# Patient Record
Sex: FEMALE | Race: ASIAN | NOT HISPANIC OR LATINO | Employment: FULL TIME | URBAN - METROPOLITAN AREA
[De-identification: names, ages, dates, MRNs, and addresses within clinical notes are randomized per-mention and may not be internally consistent; named-entity substitution may affect disease eponyms.]

---

## 2021-07-27 ENCOUNTER — TELEPHONE (OUTPATIENT)
Dept: ORTHOPEDIC SURGERY | Facility: CLINIC | Age: 36
End: 2021-07-27

## 2021-07-27 NOTE — TELEPHONE ENCOUNTER
REQ FOR APPT / AWAITING RECORDS, TC AUTH:  [facility] ER 7-, LEFT TIBIA FX; AWAITING RECORDS FOR IMAGING    Pt AWAITING PCP TO PUT IN STAT TC AUTH

## 2021-07-28 NOTE — TELEPHONE ENCOUNTER
REQ FOR APPT:  LEFT TIBIA FX, [facility] ER 7-. RECORDS SCANNED INTO Epic, [facility] REPORT 2ND PAGE

## 2021-07-29 ENCOUNTER — OFFICE VISIT (OUTPATIENT)
Dept: ORTHOPEDIC SURGERY | Facility: CLINIC | Age: 36
End: 2021-07-29

## 2021-07-29 VITALS — OXYGEN SATURATION: 99 % | BODY MASS INDEX: 29.45 KG/M2 | HEART RATE: 76 BPM | WEIGHT: 150 LBS | HEIGHT: 60 IN

## 2021-07-29 DIAGNOSIS — S99.912A INJURY OF LEFT ANKLE, INITIAL ENCOUNTER: ICD-10-CM

## 2021-07-29 DIAGNOSIS — S82.892A CLOSED FRACTURE OF LEFT ANKLE, INITIAL ENCOUNTER: Primary | ICD-10-CM

## 2021-07-29 PROCEDURE — 99204 OFFICE O/P NEW MOD 45 MIN: CPT | Performed by: ORTHOPAEDIC SURGERY

## 2021-07-29 PROCEDURE — 27786 TREATMENT OF ANKLE FRACTURE: CPT | Performed by: ORTHOPAEDIC SURGERY

## 2021-07-29 RX ORDER — HYDROCODONE BITARTRATE AND ACETAMINOPHEN 5; 325 MG/1; MG/1
1 TABLET ORAL
COMMUNITY
End: 2021-08-31 | Stop reason: SDUPTHER

## 2021-07-29 RX ORDER — HYDROCODONE BITARTRATE AND ACETAMINOPHEN 7.5; 325 MG/1; MG/1
TABLET ORAL
Qty: 30 TABLET | Refills: 0 | Status: SHIPPED | OUTPATIENT
Start: 2021-07-29 | End: 2021-08-31 | Stop reason: DRUGHIGH

## 2021-07-29 NOTE — PROGRESS NOTES
"Chief Complaint  Initial Evaluation and Pain of the Left Ankle     Subjective      Renee Thapa presents to [facility] for an evaluation of left ankle. Patient comes in using crutches for ambulation assistance. Patient was in [place] traveling for her son and daughter [place]. She was rushing when she slipped out her camper and injured her left ankle. Injury sustained 7/25/21. She had immediate pain and swelling. She went to the ED and was placed in a short leg splint.     Allergies   Allergen Reactions   • Septra [Sulfamethoxazole-Trimethoprim] Headache        Social History     Socioeconomic History   • Marital status:      Spouse name: Not on file   • Number of children: Not on file   • Years of education: Not on file   • Highest education level: Not on file   Tobacco Use   • Smoking status: Former Smoker   • Smokeless tobacco: Never Used        Review of Systems     Objective   Vital Signs:   Pulse 76   Ht 152.4 cm (60\")   Wt 68 kg (150 lb)   SpO2 99%   BMI 29.29 kg/m²       Physical Exam  Constitutional:       Appearance: Normal appearance. He is well-developed and normal weight.   HENT:      Head: Normocephalic.      Right Ear: Hearing and external ear normal.      Left Ear: Hearing and external ear normal.      Nose: Nose normal.   Eyes:      Conjunctiva/sclera: Conjunctivae normal.   Cardiovascular:      Rate and Rhythm: Normal rate.   Pulmonary:      Effort: Pulmonary effort is normal.      Breath sounds: No wheezing or rales.   Abdominal:      Palpations: Abdomen is soft.      Tenderness: There is no abdominal tenderness.   Musculoskeletal:      Cervical back: Normal range of motion.   Skin:     Findings: No rash.   Neurological:      Mental Status: He is alert and oriented to person, place, and time.   Psychiatric:         Mood and Affect: Mood and affect normal.         Judgment: Judgment normal.       Ortho Exam      LEFT ANKLE: Ambulation " with crutches. Dorsal Pedal Pulse 2+, posteriror tibialis pulse 2+. Skin intact. Sensation grossly intact. Neurovascular intact. Achilles intact. Swelling. Brisk capillary refill. Tenderness about the medial ankle. Non-tender lateral ankle.       Orthopedic Injury Treatment    Date/Time: 7/29/2021 8:44 AM  Performed by: Nichole Monae MD  Authorized by: Nichole Monae MD   Injury location: ankle  Location details: left ankle  Injury type: fracture  Pre-procedure neurovascular assessment: neurovascularly intact    Anesthesia:  Local anesthesia used: no    Sedation:  Patient sedated: no    Immobilization: cast (short leg)  Splint type: short leg  Supplies used: cotton padding (Fiberglass)  Post-procedure neurovascular assessment: post-procedure neurovascularly intact  Patient tolerance: patient tolerated the procedure well with no immediate complications  Comments: Closed treatment was obtained and fiberglass cast was applied.  The patient tolerated the procedure without any complications.;O            Imaging Results (Most Recent)     Procedure Component Value Units Date/Time    XR Ankle 2 View Left [[id number]] Resulted: 07/29/21 1552     Updated: 07/29/21 1553    Narrative:      X-Ray Report:  Left ankle(s) X-Ray  Indication: Evaluation of left ankle pain  AP and Lateral view(s)  Findings: Demonstrates a nondisplaced left distal fibula fracture.   Prior studies available for comparison: no            Result Review :       X-Ray Report:  Left ankle(s) X-Ray  Indication: Evaluation of left ankle pain  AP and Lateral view(s)  Findings: Demonstrates a nondisplaced left distal fibula fracture.   Prior studies available for comparison: no            Assessment and Plan     DX: Left distal fibula fracture     Discussed treatment plans and diagnosis with the patient. Patient was placed into a short leg cast. She was educated on cast care in office today. She understands. Patient has difficulty with ambulating with  crutches. She isn't stable on the crutches. We wrote for a scooter. Repeat films next visit.     Call or return if worsening symptoms.    Follow Up     10-14 days       Patient was given instructions and counseling regarding her condition or for health maintenance advice. Please see specific information pulled into the AVS if appropriate.     Scribed for Nichole Monae MD by Melisa Dickens.  07/29/21   08:08 EDT

## 2021-08-03 ENCOUNTER — OFFICE VISIT (OUTPATIENT)
Dept: ORTHOPEDIC SURGERY | Facility: CLINIC | Age: 36
End: 2021-08-03

## 2021-08-03 VITALS — WEIGHT: 150 LBS | HEART RATE: 74 BPM | HEIGHT: 60 IN | OXYGEN SATURATION: 98 % | BODY MASS INDEX: 29.45 KG/M2

## 2021-08-03 DIAGNOSIS — S82.892A CLOSED FRACTURE OF LEFT ANKLE, INITIAL ENCOUNTER: ICD-10-CM

## 2021-08-03 DIAGNOSIS — M25.572 LEFT ANKLE PAIN, UNSPECIFIED CHRONICITY: Primary | ICD-10-CM

## 2021-08-03 PROCEDURE — 99024 POSTOP FOLLOW-UP VISIT: CPT | Performed by: PHYSICIAN ASSISTANT

## 2021-08-03 NOTE — PROGRESS NOTES
"Chief Complaint  Pain of the Left Ankle    Subjective          Renee Thapa presents to [facility] for follow up of left distal fibula fracture sustained in 07/25/21. Patient was last seen in clinic on 07/29/21, at which time she was placed in short-leg cast. She presents today using crutches to ambulate. Patient states she was scheduled to see Dr. Monae on 8/12, but presents today due to pins and needles sensation and sharp pain of her ankle. She states she returned to work today and used a scooter. She states she does not know if this was due to pressure of the scooter. She states her her foot feels more cold now.      Objective   Vital Signs:   Pulse 74   Ht 152.4 cm (60\")   Wt 68 kg (150 lb)   SpO2 98%   BMI 29.29 kg/m²       Physical Exam  Constitutional:       Appearance: Normal appearance. He is well-developed and normal weight.   HENT:      Head: Normocephalic.      Right Ear: Hearing and external ear normal.      Left Ear: Hearing and external ear normal.      Nose: Nose normal.   Eyes:      Conjunctiva/sclera: Conjunctivae normal.   Cardiovascular:      Rate and Rhythm: Normal rate.   Pulmonary:      Effort: Pulmonary effort is normal.      Breath sounds: No wheezing or rales.   Abdominal:      Palpations: Abdomen is soft.      Tenderness: There is no abdominal tenderness.   Musculoskeletal:      Cervical back: Normal range of motion.   Skin:     Findings: No rash.   Neurological:      Mental Status: He is alert and oriented to person, place, and time.   Psychiatric:         Mood and Affect: Mood and affect normal.         Judgment: Judgment normal.     Ortho Exam  Left ankle -in cast: Cast is well fitting, clean, dry and intact.  Neurovascular intact.  Sensation is intact.  Patient able to wiggle toes.  No discoloration or swelling.  Capillary refill is less than 2 seconds.    Result Review :   The following data was reviewed by: NEO Kovacs on " 08/03/2021:         Imaging Results (Most Recent)     Procedure Component Value Units Date/Time    XR Ankle 2 View Left [[id number]] Resulted: 08/03/21 1048     Updated: 08/03/21 1049    Narrative:      X-Ray Report:  Study: X-rays ordered, taken in the office, and reviewed today  Site: left ankle Xray  Indication: left ankle pain   View: AP and Lateral view(s)  Findings: Well healing distal fibula fracture with appropriate alignment.   Prior studies available for comparison: yes                   Assessment and Plan    Problem List Items Addressed This Visit        Musculoskeletal and Injuries    Left ankle pain - Primary    Relevant Orders    XR Ankle 2 View Left (Completed)      Other Visit Diagnoses     Left distal fibula fracture               Follow Up   Return for Recheck.  Patient Instructions   Patient will follow up with Dr. Monae, as already scheduled.   Educated patient on cast care and importance of elevation of extremity while in cast.  Continue use of crutches/scooter.   Work note given to patient today.   Call with any changes or concerns.      Patient was given instructions and counseling regarding her condition or for health maintenance advice. Please see specific information pulled into the AVS if appropriate.

## 2021-08-03 NOTE — PATIENT INSTRUCTIONS
Patient will follow up with Dr. Monae, as already scheduled.   Educated patient on cast care and importance of elevation of extremity while in cast.  Continue use of crutches/scooter.   Work note given to patient today.   Call with any changes or concerns.

## 2021-08-12 ENCOUNTER — OFFICE VISIT (OUTPATIENT)
Dept: ORTHOPEDIC SURGERY | Facility: CLINIC | Age: 36
End: 2021-08-12

## 2021-08-12 VITALS — HEIGHT: 60 IN | OXYGEN SATURATION: 99 % | WEIGHT: 150 LBS | BODY MASS INDEX: 29.45 KG/M2 | HEART RATE: 78 BPM

## 2021-08-12 DIAGNOSIS — S82.892A CLOSED FRACTURE OF LEFT ANKLE, INITIAL ENCOUNTER: Primary | ICD-10-CM

## 2021-08-12 PROCEDURE — 99024 POSTOP FOLLOW-UP VISIT: CPT | Performed by: ORTHOPAEDIC SURGERY

## 2021-08-12 RX ORDER — HYDROCODONE BITARTRATE AND ACETAMINOPHEN 7.5; 325 MG/1; MG/1
1 TABLET ORAL EVERY 6 HOURS PRN
Qty: 20 TABLET | Refills: 0 | Status: SHIPPED | OUTPATIENT
Start: 2021-08-12 | End: 2021-08-31 | Stop reason: DRUGHIGH

## 2021-08-12 NOTE — PROGRESS NOTES
"Chief Complaint  Follow-up of the Left Ankle     Subjective      Renee Thapa presents to [facility] for a follow-up of left ankle. Patient sustained a left distal fibula fracture sustained in 07/25/21. Patient is present today in a short leg cast and using crutches for ambulation assistance. She states that she has significant swelling and she is unsure if this is normal. She has been elevating her ankle. She states the pins and needles sensation comes and goes. Patient works in [employer] on [employer].     Allergies   Allergen Reactions   • Septra [Sulfamethoxazole-Trimethoprim] Headache        Social History     Socioeconomic History   • Marital status:      Spouse name: Not on file   • Number of children: Not on file   • Years of education: Not on file   • Highest education level: Not on file   Tobacco Use   • Smoking status: Former Smoker   • Smokeless tobacco: Never Used   Vaping Use   • Vaping Use: Never used        Review of Systems     Objective   Vital Signs:   Pulse 78   Ht 152.4 cm (60\")   Wt 68 kg (150 lb)   SpO2 99%   BMI 29.29 kg/m²       Physical Exam  Constitutional:       Appearance: Normal appearance. He is well-developed and normal weight.   HENT:      Head: Normocephalic.      Right Ear: Hearing and external ear normal.      Left Ear: Hearing and external ear normal.      Nose: Nose normal.   Eyes:      Conjunctiva/sclera: Conjunctivae normal.   Cardiovascular:      Rate and Rhythm: Normal rate.   Pulmonary:      Effort: Pulmonary effort is normal.      Breath sounds: No wheezing or rales.   Abdominal:      Palpations: Abdomen is soft.      Tenderness: There is no abdominal tenderness.   Musculoskeletal:      Cervical back: Normal range of motion.   Skin:     Findings: No rash.   Neurological:      Mental Status: He is alert and oriented to person, place, and time.   Psychiatric:         Mood and Affect: Mood and affect normal.         " Judgment: Judgment normal.       Ortho Exam      LEFT ANKLE: Sensation grossly intact. Neurovascular intact. Patient is able to wiggle toes.  Ambulation assistance with crutches. Brisk capillary refill. Cast intact, dry and clean.       Procedures      Imaging Results (Most Recent)     None           Result Review :       XR Ankle 2 View Left    Result Date: 8/3/2021  Narrative: X-Ray Report: Study: X-rays ordered, taken in the office, and reviewed today Site: left ankle Xray Indication: left ankle pain View: AP and Lateral view(s) Findings: Well healing distal fibula fracture with appropriate alignment. Prior studies available for comparison: yes     XR Ankle 2 View Left    Result Date: 7/29/2021  Narrative: X-Ray Report: Left ankle(s) X-Ray Indication: Evaluation of left ankle pain AP and Lateral view(s) Findings: Demonstrates a nondisplaced left distal fibula fracture. Prior studies available for comparison: no        Assessment and Plan     DX: Left distal fibula fracture     Discussed treatment plans with the patient. We will continue the use of the cast. We will obtain repeat films next visit. Cast removed next visit. Patient was provided with a work note in office.     Call or return if worsening symptoms.    Follow Up     2 weeks.        Patient was given instructions and counseling regarding her condition or for health maintenance advice. Please see specific information pulled into the AVS if appropriate.     Scribed for Nichole Monae MD by Melisa Dickens.  08/12/21   08:00 EDT      I have personally performed the services described in this document as scribed by the above individual and it is both accurate and complete. Nichole Monae MD 08/12/21

## 2021-08-31 ENCOUNTER — OFFICE VISIT (OUTPATIENT)
Dept: ORTHOPEDIC SURGERY | Facility: CLINIC | Age: 36
End: 2021-08-31

## 2021-08-31 VITALS — WEIGHT: 150 LBS | HEIGHT: 60 IN | BODY MASS INDEX: 29.45 KG/M2 | OXYGEN SATURATION: 98 % | HEART RATE: 84 BPM

## 2021-08-31 DIAGNOSIS — S82.832D CLOSED FRACTURE OF DISTAL END OF LEFT FIBULA WITH ROUTINE HEALING, UNSPECIFIED FRACTURE MORPHOLOGY, SUBSEQUENT ENCOUNTER: Primary | ICD-10-CM

## 2021-08-31 DIAGNOSIS — M25.572 LEFT ANKLE PAIN, UNSPECIFIED CHRONICITY: Primary | ICD-10-CM

## 2021-08-31 DIAGNOSIS — S82.832D CLOSED FRACTURE OF DISTAL END OF LEFT FIBULA WITH ROUTINE HEALING, UNSPECIFIED FRACTURE MORPHOLOGY, SUBSEQUENT ENCOUNTER: ICD-10-CM

## 2021-08-31 PROCEDURE — 99024 POSTOP FOLLOW-UP VISIT: CPT | Performed by: PHYSICIAN ASSISTANT

## 2021-08-31 RX ORDER — HYDROCODONE BITARTRATE AND ACETAMINOPHEN 5; 325 MG/1; MG/1
1 TABLET ORAL EVERY 8 HOURS PRN
Qty: 20 TABLET | Refills: 0 | Status: SHIPPED | OUTPATIENT
Start: 2021-08-31 | End: 2021-09-09 | Stop reason: SDUPTHER

## 2021-08-31 NOTE — PATIENT INSTRUCTIONS
Patient taken out of short leg cast today, x-rays taken and reviewed, patient placed in a walking boot, instructions for icing and elevating provided.  Recommend Tylenol for pain relief.  Recommend gentle range of motion, exercises provided.  Follow-up in 3 weeks with x-ray at that time.  Suspect patient doing well she can transition out of the walking boot.

## 2021-08-31 NOTE — PROGRESS NOTES
"Chief Complaint  Follow-up and Pain of the Left Ankle    Subjective          Renee Thapa presents to [facility] for follow-up on left ankle after sustaining a left distal fibula fracture 7/5/2021 after tripping getting out of her camper while on vacation.  She presents in a short leg cast in a wheelchair today.  She states that swelling and pain have significantly decreased.  She has some pain shooting into her calf and behind her knee.  She works in [employer] on [employer].    Objective   Vital Signs:   Pulse 84   Ht 152.4 cm (60\")   Wt 68 kg (150 lb)   SpO2 98%   BMI 29.29 kg/m²       Physical Exam  Constitutional:       Appearance: Normal appearance. Patient is well-developed and normal weight.   HENT:      Head: Normocephalic.      Right Ear: Hearing and external ear normal.      Left Ear: Hearing and external ear normal.      Nose: Nose normal.   Eyes:      Conjunctiva/sclera: Conjunctivae normal.   Cardiovascular:      Rate and Rhythm: Normal rate.   Pulmonary:      Effort: Pulmonary effort is normal.      Breath sounds: No wheezing or rales.   Abdominal:      Palpations: Abdomen is soft.      Tenderness: There is no abdominal tenderness.   Musculoskeletal:      Cervical back: Normal range of motion.   Skin:     Findings: No rash.   Neurological:      Mental Status: Patient is alert and oriented to person, place, and time.   Psychiatric:         Mood and Affect: Mood and affect normal.         Judgment: Judgment normal.     Ortho Exam  Left ankle: Mild lateral soft tissue swelling, skin intact, mild tenderness to palpation of the lateral malleolus.  She has limited inversion and eversion, good plantar and dorsiflexion.  Sensation is intact, able to wiggle all digits, dorsalis pedis pulses 2+.  Gait not tested.  Result Review :            Imaging Results (Most Recent)     Procedure Component Value Units Date/Time    XR Ankle 2 View Left [[id number]] Resulted: " 08/31/21 0911     Updated: 08/31/21 0911    Narrative:      X-Ray Report:  Study: X-rays ordered, taken in the office, and reviewed today  Site: Left ankle xray  Indication: Pain  View: AP and Lateral view(s)  Findings: Well-healing left distal fibula fracture, mild lateral soft   tissue swelling, no other acute osseous abnormalities or malalignment  Prior studies available for comparison: yes                   Assessment and Plan    Problem List Items Addressed This Visit        Musculoskeletal and Injuries    Left ankle pain - Primary    Relevant Orders    XR Ankle 2 View Left (Completed)    Closed fracture of distal end of left fibula with routine healing    Current Assessment & Plan     Patient taken out of short leg cast today, x-rays taken and reviewed, patient placed in a walking boot, instructions for icing and elevating provided.  Recommend Tylenol for pain relief.  Recommend gentle range of motion, exercises provided.  Follow-up in 3 weeks with x-ray at that time.  Suspect patient doing well she can transition out of the walking boot.               Follow Up {Instructions Charge Capture  Follow-up Communications :23}  Return in about 3 weeks (around 9/21/2021) for Recheck.  Patient Instructions   Patient taken out of short leg cast today, x-rays taken and reviewed, patient placed in a walking boot, instructions for icing and elevating provided.  Recommend Tylenol for pain relief.  Recommend gentle range of motion, exercises provided.  Follow-up in 3 weeks with x-ray at that time.  Suspect patient doing well she can transition out of the walking boot.    Patient was given instructions and counseling regarding her condition or for health maintenance advice. Please see specific information pulled into the AVS if appropriate.        Answers for HPI/ROS submitted by the patient on 8/24/2021  What is the primary reason for your visit?: Lower Extremity Injury  Incident occurred: more than 1 week ago  [place]  Injury mechanism: an eversion injury, an inversion injury, a fall  Pain location: left leg, left ankle, right hip  Pain quality: aching, burning, cramping, shooting  Pain - numeric: 8/10  Pain course: constant  tingling: Yes  inability to bear weight: Yes  loss of motion: Yes  loss of sensation: No  muscle weakness: Yes  Foreign body present: no foreign bodies  Aggravated by: movement

## 2021-09-09 DIAGNOSIS — S82.832D CLOSED FRACTURE OF DISTAL END OF LEFT FIBULA WITH ROUTINE HEALING, UNSPECIFIED FRACTURE MORPHOLOGY, SUBSEQUENT ENCOUNTER: ICD-10-CM

## 2021-09-09 RX ORDER — HYDROCODONE BITARTRATE AND ACETAMINOPHEN 5; 325 MG/1; MG/1
1 TABLET ORAL EVERY 8 HOURS PRN
Qty: 20 TABLET | Refills: 0 | Status: SHIPPED | OUTPATIENT
Start: 2021-09-09 | End: 2021-10-21

## 2021-09-21 ENCOUNTER — OFFICE VISIT (OUTPATIENT)
Dept: ORTHOPEDIC SURGERY | Facility: CLINIC | Age: 36
End: 2021-09-21

## 2021-09-21 VITALS — HEART RATE: 71 BPM | WEIGHT: 150 LBS | OXYGEN SATURATION: 99 % | HEIGHT: 60 IN | BODY MASS INDEX: 29.45 KG/M2

## 2021-09-21 DIAGNOSIS — S82.892D CLOSED FRACTURE OF LEFT ANKLE WITH ROUTINE HEALING, SUBSEQUENT ENCOUNTER: ICD-10-CM

## 2021-09-21 DIAGNOSIS — M25.572 LEFT ANKLE PAIN, UNSPECIFIED CHRONICITY: Primary | ICD-10-CM

## 2021-09-21 PROBLEM — S82.892A CLOSED FRACTURE OF LEFT ANKLE: Status: ACTIVE | Noted: 2021-09-21

## 2021-09-21 PROBLEM — S82.832D CLOSED FRACTURE OF DISTAL END OF LEFT FIBULA WITH ROUTINE HEALING: Status: RESOLVED | Noted: 2021-08-31 | Resolved: 2021-09-21

## 2021-09-21 PROCEDURE — 99024 POSTOP FOLLOW-UP VISIT: CPT | Performed by: PHYSICIAN ASSISTANT

## 2021-09-21 NOTE — PROGRESS NOTES
"Chief Complaint  Follow-up and Pain of the Left Ankle    Subjective          Renee Thapa presents to [facility] for follow-up on left ankle after sustaining a left distal fibula fracture 7/5/2021 after tripping getting out of her camper while on vacation.  She presents today not using the walking boot.  She states she wore it for 2 weeks following her last visit.  She states her pain and swelling are gradually improving although she still has pain with weightbearing and especially when going up or down the stairs.  She states that she has a burning sensation that travels from her ankle up to her hip.  She also has pain at the base of the big toe after wearing the walking boot.    Objective   Allergies   Allergen Reactions   • Septra [Sulfamethoxazole-Trimethoprim] Headache       Vital Signs:   Pulse 71   Ht 152.4 cm (60\")   Wt 68 kg (150 lb)   SpO2 99%   BMI 29.29 kg/m²       Physical Exam  Constitutional:       Appearance: Normal appearance. Patient is well-developed and normal weight.   HENT:      Head: Normocephalic.      Right Ear: Hearing and external ear normal.      Left Ear: Hearing and external ear normal.      Nose: Nose normal.   Eyes:      Conjunctiva/sclera: Conjunctivae normal.   Cardiovascular:      Rate and Rhythm: Normal rate.   Pulmonary:      Effort: Pulmonary effort is normal.      Breath sounds: No wheezing or rales.   Abdominal:      Palpations: Abdomen is soft.      Tenderness: There is no abdominal tenderness.   Musculoskeletal:      Cervical back: Normal range of motion.   Skin:     Findings: No rash.   Neurological:      Mental Status: Patient is alert and oriented to person, place, and time.   Psychiatric:         Mood and Affect: Mood and affect normal.         Judgment: Judgment normal.     Ortho Exam  Left ankle: Mild soft tissue swelling over the lateral malleolus, skin is intact, there is tenderness to palpation in this region.  She has good " plantar and dorsi flexion, Limited inversion and eversion, sensation light touch intact, able to wiggle all digits, posterior tib pulses 2+, gait is antalgic.  Result Review :            Imaging Results (Most Recent)     Procedure Component Value Units Date/Time    XR Ankle 2 View Left [[id number]] Resulted: 09/21/21 0907     Updated: 09/21/21 0908    Narrative:      X-Ray Report:  Study: X-rays ordered, taken in the office, and reviewed today  Site: Left ankle xray  Indication: Pain  View: AP and Lateral view(s)  Findings: Fracture with good alignment, good bony healing noted, no   displacement from prior, mild soft tissue swelling over the lateral   malleolus  Prior studies available for comparison: yes                   Assessment and Plan    Problem List Items Addressed This Visit        Musculoskeletal and Injuries    Left ankle pain - Primary    Relevant Orders    XR Ankle 2 View Left (Completed)    Ambulatory Referral to Physical Therapy Evaluate and treat; Stretching, ROM, Strengthening    Closed fracture of left ankle    Current Assessment & Plan     Patient will continue wearing walking boot, she will begin doing outpatient PT, follow-up in 4 weeks for recheck.  No x-ray at that time.         Relevant Orders    Ambulatory Referral to Physical Therapy Evaluate and treat; Stretching, ROM, Strengthening          Follow Up   Return in about 4 weeks (around 10/19/2021) for Recheck.  Patient Instructions   Patient will continue wearing walking boot, she will begin doing outpatient PT, follow-up in 4 weeks for recheck.  No x-ray at that time.    Patient was given instructions and counseling regarding her condition or for health maintenance advice. Please see specific information pulled into the AVS if appropriate.        Answers for HPI/ROS submitted by the patient on 9/19/2021  Please describe your symptoms.: Follow up from left ankle fracture.  I am walking slow without the walking boot and crutches. But still  not walking normally.  I feel like left foot turns inward in order for me walk quicker.  I have difficulty getting down stairs. I have to wear compression sock brace due to some swelling in ankle. My left leg feels cold most of the day.  Have you had these symptoms before?: Yes  How long have you been having these symptoms?: Greater than 2 weeks  Please list any medications you are currently taking for this condition.: Hydrocodone. I take  once in the morning and one in the evening.  Please describe any probable cause for these symptoms. : Being on my feet from working all day and not being able to elevate my leg.  What is the primary reason for your visit?: Other

## 2021-09-21 NOTE — PATIENT INSTRUCTIONS
Patient will continue wearing walking boot, she will begin doing outpatient PT, follow-up in 4 weeks for recheck.  No x-ray at that time.

## 2021-10-21 ENCOUNTER — OFFICE VISIT (OUTPATIENT)
Dept: ORTHOPEDIC SURGERY | Facility: CLINIC | Age: 36
End: 2021-10-21

## 2021-10-21 VITALS — BODY MASS INDEX: 30.59 KG/M2 | HEIGHT: 60 IN | HEART RATE: 86 BPM | WEIGHT: 155.8 LBS | OXYGEN SATURATION: 99 %

## 2021-10-21 DIAGNOSIS — M54.32 LEFT SCIATIC NERVE PAIN: ICD-10-CM

## 2021-10-21 DIAGNOSIS — S82.832D CLOSED FRACTURE OF DISTAL END OF LEFT FIBULA WITH ROUTINE HEALING, UNSPECIFIED FRACTURE MORPHOLOGY, SUBSEQUENT ENCOUNTER: Primary | ICD-10-CM

## 2021-10-21 PROCEDURE — 96372 THER/PROPH/DIAG INJ SC/IM: CPT | Performed by: PHYSICIAN ASSISTANT

## 2021-10-21 PROCEDURE — 99213 OFFICE O/P EST LOW 20 MIN: CPT | Performed by: PHYSICIAN ASSISTANT

## 2021-10-21 RX ORDER — TOPIRAMATE 25 MG/1
1 CAPSULE, EXTENDED RELEASE ORAL 2 TIMES DAILY
COMMUNITY
Start: 2021-08-02

## 2021-10-21 RX ORDER — ZOLPIDEM TARTRATE 12.5 MG/1
TABLET, FILM COATED, EXTENDED RELEASE ORAL
COMMUNITY
Start: 2021-09-30

## 2021-10-21 RX ORDER — DEXAMETHASONE SODIUM PHOSPHATE 4 MG/ML
8 INJECTION, SOLUTION INTRA-ARTICULAR; INTRALESIONAL; INTRAMUSCULAR; INTRAVENOUS; SOFT TISSUE ONCE
Status: COMPLETED | OUTPATIENT
Start: 2021-10-21 | End: 2021-10-21

## 2021-10-21 RX ORDER — CITALOPRAM 20 MG/1
20 TABLET ORAL DAILY
COMMUNITY
Start: 2021-08-20

## 2021-10-21 RX ADMIN — DEXAMETHASONE SODIUM PHOSPHATE 8 MG: 4 INJECTION, SOLUTION INTRA-ARTICULAR; INTRALESIONAL; INTRAMUSCULAR; INTRAVENOUS; SOFT TISSUE at 09:03

## 2021-10-21 NOTE — PATIENT INSTRUCTIONS
Recommend continuation of PT and home exercises, rest ice and elevate as needed, Tylenol or ibuprofen for pain.  Discontinue walking boot at this time.  Suspect patient is suffering from sciatic symptoms given history of low back pain, wrote a note to therapist to continue working on ankle range of motion, ambulation and low back/sciatic symptoms.  Patient may benefit from spinal traction.  She elected to receive an IM steroid injection today, risks and benefits were discussed and the patient tolerated this well.  Follow-up in 1 month for recheck.

## 2021-10-21 NOTE — PROGRESS NOTES
"Chief Complaint  Pain and Follow-up of the Left Ankle    Subjective          Renee Thapa presents to [facility] for follow-up on left ankle after sustaining a left distal fibula fracture 7/5/2021 after tripping getting out of her camper while on vacation.  She presents today using the walking boot.  She has been doing PT and states she has noticed increased range of motion in the ankle.  She still having some ankle tenderness on the lateral aspect and notes pain shooting from her ankle all the way to her hip and low back.  She does admit to a history of low back pain, has had x-rays showing degenerative changes in the lumbar spine.  She states she is done PT for her neck and her back in the past which was helpful.  She states the pain shooting from the ankle to the hip is a burning sensation, denies numbness or tingling.    Objective   Allergies   Allergen Reactions   • Septra [Sulfamethoxazole-Trimethoprim] Headache       Vital Signs:   Pulse 86   Ht 152.4 cm (60\")   Wt 70.7 kg (155 lb 12.8 oz)   SpO2 99%   BMI 30.43 kg/m²       Physical Exam  Constitutional:       Appearance: Normal appearance. Patient is well-developed and normal weight.   HENT:      Head: Normocephalic.      Right Ear: Hearing and external ear normal.      Left Ear: Hearing and external ear normal.      Nose: Nose normal.   Eyes:      Conjunctiva/sclera: Conjunctivae normal.   Cardiovascular:      Rate and Rhythm: Normal rate.   Pulmonary:      Effort: Pulmonary effort is normal.      Breath sounds: No wheezing or rales.   Abdominal:      Palpations: Abdomen is soft.      Tenderness: There is no abdominal tenderness.   Musculoskeletal:      Cervical back: Normal range of motion.   Skin:     Findings: No rash.   Neurological:      Mental Status: Patient is alert and oriented to person, place, and time.   Psychiatric:         Mood and Affect: Mood and affect normal.         Judgment: Judgment normal. "     Ortho Exam  Left ankle: Skin intact, no swelling, mild tenderness over the distal fibula, sensation light touch intact, posterior tib pulses 2+, good range of motion of the ankle with plantar and dorsiflexion, good inversion and eversion, able to wiggle all digits, good range of motion of the knee and hip.   Lumbar spine: No tenderness to the vertebrae in the low back, there is left-sided paraspinal muscle tenderness and gluteal tenderness.  Positive straight leg raise on the left at 40 degrees.  Result Review :            Imaging Results (Most Recent)     None                Assessment and Plan    Problem List Items Addressed This Visit        Musculoskeletal and Injuries    Closed fracture of distal end of left fibula with routine healing - Primary    Current Assessment & Plan     Recommend continuation of PT and home exercises, rest ice and elevate as needed, Tylenol or ibuprofen for pain.  Discontinue walking boot at this time.  Suspect patient is suffering from sciatic symptoms given history of low back pain, wrote a note to therapist to continue working on ankle range of motion, ambulation and low back/sciatic symptoms.  Patient may benefit from spinal traction.  She elected to receive an IM steroid injection today, risks and benefits were discussed and the patient tolerated this well.  Follow-up in 1 month for recheck.         Relevant Orders    Ambulatory Referral to Physical Therapy Evaluate and treat    Left sciatic nerve pain    Relevant Medications    dexamethasone (DECADRON) injection 8 mg (Completed) (Start on 10/21/2021 10:00 AM)    Other Relevant Orders    Ambulatory Referral to Physical Therapy Evaluate and treat          Follow Up   Return in about 4 weeks (around 11/18/2021) for Recheck.  Patient Instructions   Recommend continuation of PT and home exercises, rest ice and elevate as needed, Tylenol or ibuprofen for pain.  Discontinue walking boot at this time.  Suspect patient is suffering  from sciatic symptoms given history of low back pain, wrote a note to therapist to continue working on ankle range of motion, ambulation and low back/sciatic symptoms.  Patient may benefit from spinal traction.  She elected to receive an IM steroid injection today, risks and benefits were discussed and the patient tolerated this well.  Follow-up in 1 month for recheck.    Patient was given instructions and counseling regarding her condition or for health maintenance advice. Please see specific information pulled into the AVS if appropriate.

## 2022-06-30 PROBLEM — F33.1 MAJOR DEPRESSIVE DISORDER, RECURRENT, MODERATE: Status: ACTIVE | Noted: 2021-07-27

## 2022-06-30 PROBLEM — G43.909 MIGRAINE: Status: ACTIVE | Noted: 2021-07-27

## 2022-06-30 PROBLEM — G47.00 INSOMNIA: Status: ACTIVE | Noted: 2021-10-27

## 2022-06-30 PROBLEM — F41.9 ANXIETY: Status: ACTIVE | Noted: 2021-07-27

## 2022-09-06 ENCOUNTER — OFFICE VISIT (OUTPATIENT)
Dept: ORTHOPEDIC SURGERY | Facility: CLINIC | Age: 37
End: 2022-09-06

## 2022-09-06 VITALS — HEART RATE: 53 BPM | OXYGEN SATURATION: 100 % | WEIGHT: 154 LBS | HEIGHT: 60 IN | BODY MASS INDEX: 30.23 KG/M2

## 2022-09-06 DIAGNOSIS — S82.892A CLOSED FRACTURE OF LEFT ANKLE, INITIAL ENCOUNTER: Primary | ICD-10-CM

## 2022-09-06 DIAGNOSIS — M25.572 LEFT ANKLE PAIN, UNSPECIFIED CHRONICITY: ICD-10-CM

## 2022-09-06 PROCEDURE — 99024 POSTOP FOLLOW-UP VISIT: CPT | Performed by: PHYSICIAN ASSISTANT

## 2022-09-06 NOTE — PROGRESS NOTES
"Chief Complaint  Pain and Follow-up of the Left Ankle    Subjective      Renee Thapa presents to [facility] for evaluation of left ankle.  Patient has history of a left distal fibula fracture that was sustained 07/25/2021.  She was immobilized for 6 to 8 weeks following the injury.  She states as of recently the beginning of August she began working out again.  Since then, she has been experiencing pain to the lateral foot and up into the ankle.  She denies any recent trauma or injury.  Denies swelling.  She notices this more when wearing Nike's.    Objective   Allergies   Allergen Reactions   • Sulfamethoxazole-Trimethoprim Headache       Vital Signs:   Pulse 53   Ht 152.4 cm (60\")   Wt 69.9 kg (154 lb)   SpO2 100%   BMI 30.08 kg/m²       Physical Exam    Constitutional: Awake, alert. Well nourished appearance.    Integumentary: Warm, dry, intact. No obvious rashes.    HENT: Atraumatic, normocephalic.   Respiratory: Non labored respirations .   Cardiovascular: Intact peripheral pulses.    Psychiatric: Normal mood and affect. A&O X3    Ortho Exam  Left ankle: Nontender to palpate the lateral malleolus.  No tenderness to medial malleolus.  Mildly tender to the lateral foot.  Skin dry and intact, no swelling or discoloration.  Full plantarflexion and dorsiflexion of the ankle.  Ankle flexor strength is 5/5.  Able to wiggle the toes.  Achilles intact.  Sensation to light touch intact.  Neurovascular intact distally.    Imaging Results (Most Recent)     Procedure Component Value Units Date/Time    XR Ankle 2 View Left [[id number]] Resulted: 09/06/22 1622     Updated: 09/06/22 1629    Narrative:      X-Ray Report:  Study: X-rays ordered, taken in the office, and reviewed today  Site: left ankle Xray  Indication: Pain  View: AP and Lateral view(s)  Findings: Previous distal fibula fracture with evidence of good bone   healing.  Prior studies available for comparison: yes            "         Assessment and Plan   Problem List Items Addressed This Visit        Musculoskeletal and Injuries    Left ankle pain    Closed fracture of left ankle - Primary    Relevant Orders    XR Ankle 2 View Left (Completed)          Follow Up   Return in about 3 weeks (around 9/27/2022).    Patient Instructions   Continue with supportive tennis shoe for the gym     Avoid high impact or strenuous activity. Avoid uneven terrain     Follow up in 2-3 weeks. Obtain left foot imaging.    Patient was given instructions and counseling regarding her condition or for health maintenance advice. Please see specific information pulled into the AVS if appropriate.

## 2022-09-06 NOTE — PATIENT INSTRUCTIONS
Continue with supportive tennis shoe for the gym     Avoid high impact or strenuous activity. Avoid uneven terrain     Follow up in 2-3 weeks. Obtain left foot imaging.

## 2023-09-21 ENCOUNTER — OFFICE VISIT (OUTPATIENT)
Dept: ORTHOPEDIC SURGERY | Facility: CLINIC | Age: 38
End: 2023-09-21
Payer: OTHER GOVERNMENT

## 2023-09-21 VITALS
OXYGEN SATURATION: 97 % | BODY MASS INDEX: 30.23 KG/M2 | DIASTOLIC BLOOD PRESSURE: 70 MMHG | HEART RATE: 83 BPM | WEIGHT: 154 LBS | SYSTOLIC BLOOD PRESSURE: 95 MMHG | HEIGHT: 60 IN

## 2023-09-21 DIAGNOSIS — M25.572 LEFT ANKLE PAIN, UNSPECIFIED CHRONICITY: Primary | ICD-10-CM

## 2023-09-21 DIAGNOSIS — M72.2 PLANTAR FASCIITIS: ICD-10-CM

## 2023-09-21 RX ORDER — DICLOFENAC SODIUM 75 MG/1
75 TABLET, DELAYED RELEASE ORAL 2 TIMES DAILY
Qty: 60 TABLET | Refills: 1 | Status: SHIPPED | OUTPATIENT
Start: 2023-09-21

## 2023-09-21 NOTE — PROGRESS NOTES
"Chief Complaint  Follow-up of the Left Ankle     Subjective      Renee Lazo presents to [facility] for follow up of the left ankle. She has been noting numbness and tingling in the left Intact dorsiflexion and plantar flexion. And pain in the left heel.  She broke her ankle July of 2021.      Allergies   Allergen Reactions    Sulfamethoxazole-Trimethoprim Headache        Social History     Socioeconomic History    Marital status:    Tobacco Use    Smoking status: Former     Passive exposure: Past    Smokeless tobacco: Never   Vaping Use    Vaping Use: Never used   Substance and Sexual Activity    Alcohol use: Yes     Alcohol/week: 3.0 standard drinks     Types: 2 Glasses of wine, 1 Drinks containing 0.5 oz of alcohol per week     Comment: Socially    Drug use: Never    Sexual activity: Yes     Partners: Male     Birth control/protection: I.U.D.        I reviewed the patient's chief complaint, history of present illness, review of systems, past medical history, surgical history, family history, social history, medications, and allergy list.     Review of Systems     Constitutional: Denies fevers, chills, weight loss  Cardiovascular: Denies chest pain, shortness of breath  Skin: Denies rashes, acute skin changes  Neurologic: Denies headache, loss of consciousness        Vital Signs:   BP 95/70   Pulse 83   Ht 152.4 cm (60\")   Wt 69.9 kg (154 lb)   SpO2 97%   BMI 30.08 kg/m²          Physical Exam  General: Alert. No acute distress    Ortho Exam        LEFT ANKLE Positive EHL, FHL, GS and TA. Sensation intact to all 5 nerves of the foot. Positive pulses. Neurovascularly intact. Calf soft, Non-tender. Plantar flexion 25, dorsiflexion 10. Stable to stress. Tender to the lateral aspect of the ankle and heel.  Intact flexion and extension of toes.         Procedures      Imaging Results (Most Recent)       Procedure Component Value Units Date/Time    XR Ankle 2 View Left " [[id number]] Resulted: 09/21/23 1429     Updated: 09/21/23 1435             Result Review :     X-Ray Report:  Left ankle(s) X-Ray  Indication: Evaluation of the left ankle  AP/Lateral view(s)  Findings: No fracture or dislocation.   Prior studies available for comparison: yes       No results found.           Assessment and Plan     Diagnoses and all orders for this visit:    1. Left ankle pain, unspecified chronicity (Primary)  -     XR Ankle 2 View Left    2. Plantar fasciitis        Discussed the treatment plan with the patient. I reviewed the X-rays that were obtained today with the patient.     HEP exercises. Prescribed anti inflammatory.     Call or return if worsening symptoms.    Follow Up     PRN      Patient was given instructions and counseling regarding her condition or for health maintenance advice. Please see specific information pulled into the AVS if appropriate.     Scribed for Nichole Monae MD by Cecilia Irwin MA.  09/21/23   14:32 EDT    I have personally performed the services described in this document as scribed by the above individual and it is both accurate and complete. Nichole Monae MD 09/21/23

## 2023-11-15 ENCOUNTER — TELEPHONE (OUTPATIENT)
Dept: OBSTETRICS AND GYNECOLOGY | Facility: CLINIC | Age: 38
End: 2023-11-15
Payer: OTHER GOVERNMENT

## 2023-11-15 NOTE — TELEPHONE ENCOUNTER
Provider: SHEYLA Patton    Caller: FILIBERTO TODD    Relationship to Patient: SELF    Pharmacy:     Phone Number: [phone]     Reason for Call: LABS     When was the patient last seen: NEW GYN, NEVER SEEN    PATIENT WOULD LIKE TO KNOW IF SHE GET LABS DONE BEFORE NEW GYN APPOINTMENT THAT IS ON 11.21.23 FOR HORMONE IMBALANCE.    PATIENT CAN BE REACHED [phone]

## 2023-11-17 ENCOUNTER — TELEPHONE (OUTPATIENT)
Dept: OBSTETRICS AND GYNECOLOGY | Facility: CLINIC | Age: 38
End: 2023-11-17
Payer: OTHER GOVERNMENT

## 2023-11-17 NOTE — TELEPHONE ENCOUNTER
Left patient a voicemail. Patient would have to be seen in office to discuss before labs was ordered.

## 2023-11-17 NOTE — TELEPHONE ENCOUNTER
Provider: SHEYLA Patton    Caller: FILIBERTO TODD    Relationship to Patient: SELF    Pharmacy:     Phone Number: [phone]    Reason for Call: RETURNING CALL    When was the patient last seen: NEW GYN/NEVER SEEN    PATIENT RETURNING CALL FROM TODAY 11.17.23. PATIENT STATED THAT THERE WAS NO VOICEMAIL LEFT.    PATIENT CAN BE REACHED /[phone]    THANK YOU

## 2023-11-21 ENCOUNTER — OFFICE VISIT (OUTPATIENT)
Dept: OBSTETRICS AND GYNECOLOGY | Facility: CLINIC | Age: 38
End: 2023-11-21
Payer: OTHER GOVERNMENT

## 2023-11-21 VITALS
HEART RATE: 79 BPM | HEIGHT: 60 IN | WEIGHT: 166 LBS | DIASTOLIC BLOOD PRESSURE: 78 MMHG | SYSTOLIC BLOOD PRESSURE: 124 MMHG | BODY MASS INDEX: 32.59 KG/M2

## 2023-11-21 DIAGNOSIS — Z01.419 ENCOUNTER FOR GYNECOLOGICAL EXAMINATION WITHOUT ABNORMAL FINDING: Primary | ICD-10-CM

## 2023-11-21 DIAGNOSIS — T83.32XA INTRAUTERINE CONTRACEPTIVE DEVICE THREADS LOST, INITIAL ENCOUNTER: ICD-10-CM

## 2023-11-21 PROCEDURE — G0123 SCREEN CERV/VAG THIN LAYER: HCPCS

## 2023-11-21 PROCEDURE — 87624 HPV HI-RISK TYP POOLED RSLT: CPT

## 2023-11-21 NOTE — PROGRESS NOTES
"Well Woman Visit    CC: Annual well woman exam       HPI:   38 y.o. Contraception or HRT: Contraception:  Mirena IUD  Menses:  none with mirena  Pain:  None  Incontinence concerns: No  Hx of abnormal pap:  No  Pt has no complaints today.      History: PMHx, Meds, Allergies, PSHx, Social Hx, and POBHx all reviewed and updated.      PHYSICAL EXAM:  /78   Pulse 79   Ht 152.4 cm (60\")   Wt 75.3 kg (166 lb)   BMI 32.42 kg/m²   General- NAD, alert and oriented, appropriate  Psych- Normal mood, good memory  Neck- No masses, no thyroid enlargement  CV- Regular rhythm, no murnurs  Resp- CTA to bases, no wheezes  Abdomen- Soft, non distended, non tender, no masses    Breast left-  Bilaterally symmetrical, no masses, non tender, no nipple discharge  Breast right- Bilaterally symmetrical, no masses, non tender, no nipple discharge    External genitalia- Normal female, no lesions  Urethra/meatus- Normal, no masses, non tender, no prolapse  Bladder- Normal, no masses, non tender, no prolapse  Vagina- Normal, no atrophy, no lesions, no discharge, no prolapse  Cvx- Normal, no lesions, no discharge, No cervical motion tenderness, IUD strings NOT VISIBLE  Uterus- Normal size, shape & consistency.  Non tender, mobile, & no prolapse  Adnexa- No mass, non tender  Anus/Rectum/Perineum- Not performed    Lymphatic- No palpable neck, axillary, or groin nodes  Ext- No edema, no cyanosis    Skin- No lesions, no rashes, no acanthosis nigricans        ASSESSMENT and PLAN:  WWE and Contraception    Diagnoses and all orders for this visit:    1. Encounter for gynecological examination without abnormal finding (Primary)  -     IgP, Aptima HPV    2. Intrauterine contraceptive device threads lost, initial encounter  -     US Non-ob Transvaginal; Future    Will check pelvic u/s since iud strings were not seen. Happy with mirena and desires to continue for now. Inserted in 2019. Considering tubal ligation/salpingectomy. "     Counseling:     Track menses, RTO IF <q21d, >7d long, or heavy    Domestic violence/abuse screen: negative    Depression screen: no SI    Preventative:   BREAST HEALTH- Monthly self breast exam importance and how to reviewed. MMG and/or MRI (prn) reviewed per society guidelines and her individual history. Mammo/MRI screen: Not medically needed.  CERVICAL CANCER Screening- Reviewed current ASCCP guidelines for screening w and wo cotest HPV, age specific.  Screen: Updated today.  COLON CANCER Screening- Reviewed current medical society guidelines and options.  Colonoscopy screen:  Not medically needed.  SEXUAL HEALTH: Declines STD screening.  VACCINATIONS Recommended: Flu annually, Gardisil/HPV vaccine (up to 44yo).  Importance discussed, risk being unvaccinated reviewed.  Questions answered  Smoking status- NON SMOKER.  Importance of avoiding second hand smoke.  Follow up PCP/Specialist PMHx and Labs  Myriad : does not qualify  Gardasil status: declined      She understands the importance of having any ordered tests to be performed in a timely fashion.  She is encouraged to review her results online and/or contact or office if she has questions.     Follow Up:  Return in about 1 year (around 11/21/2024) for Annual physical.      Daksha Quiroga, APRN  11/21/2023

## 2023-11-27 ENCOUNTER — PATIENT ROUNDING (BHMG ONLY) (OUTPATIENT)
Dept: OBSTETRICS AND GYNECOLOGY | Facility: CLINIC | Age: 38
End: 2023-11-27
Payer: OTHER GOVERNMENT

## 2023-11-27 NOTE — PROGRESS NOTES
A My-Chart message has been sent to the patient for PATIENT ROUNDING with [facility].

## 2023-11-28 LAB
CYTOLOGIST CVX/VAG CYTO: NORMAL
CYTOLOGY CVX/VAG DOC CYTO: NORMAL
CYTOLOGY CVX/VAG DOC THIN PREP: NORMAL
DX ICD CODE: NORMAL
HIV 1 & 2 AB SER-IMP: NORMAL
HPV I/H RISK 4 DNA CVX QL PROBE+SIG AMP: NEGATIVE
Lab: NORMAL
OTHER STN SPEC: NORMAL
STAT OF ADQ CVX/VAG CYTO-IMP: NORMAL

## 2024-03-19 ENCOUNTER — OFFICE VISIT (OUTPATIENT)
Dept: PULMONOLOGY | Facility: CLINIC | Age: 39
End: 2024-03-19
Payer: OTHER GOVERNMENT

## 2024-03-19 VITALS
RESPIRATION RATE: 14 BRPM | BODY MASS INDEX: 29.88 KG/M2 | HEIGHT: 60 IN | SYSTOLIC BLOOD PRESSURE: 110 MMHG | DIASTOLIC BLOOD PRESSURE: 77 MMHG | WEIGHT: 152.2 LBS | TEMPERATURE: 98.2 F | OXYGEN SATURATION: 98 % | HEART RATE: 77 BPM

## 2024-03-19 DIAGNOSIS — G47.00 INSOMNIA, UNSPECIFIED TYPE: Primary | ICD-10-CM

## 2024-03-19 PROCEDURE — 99203 OFFICE O/P NEW LOW 30 MIN: CPT | Performed by: INTERNAL MEDICINE

## 2024-03-19 RX ORDER — TOPIRAMATE 25 MG/1
25 CAPSULE, EXTENDED RELEASE ORAL 2 TIMES DAILY
COMMUNITY

## 2024-03-19 RX ORDER — LEVONORGESTREL 52 MG/1
1 INTRAUTERINE DEVICE INTRAUTERINE ONCE
COMMUNITY

## 2024-03-19 NOTE — PROGRESS NOTES
Pulmonary Consultation    Clover Carver AP*,    Thank you for asking me to see Renee Lazo for   Chief Complaint   Patient presents with    Establish Care    Insomnia   .      History of Present Illness  Renee Lazo is a 38 y.o. female with a PMH significant for anxiety and depression with chronic insomnia for more than 10 to 12 years presents for evaluation patient is presently on Wellbutrin but still is not getting adequate sleep with frequent awakenings she feels that she is not well rested patient has previously been on Lexapro for anxiety and depression and has used Ambien before she is not aware of having excessive snoring      Tobacco use history:  Former smoker      Review of Systems: History obtained from chart review and the patient.  Review of Systems   All other systems reviewed and are negative.    As described in the HPI. Otherwise, remainder of ROS (14 systems) were negative.    Patient Active Problem List   Diagnosis    Left ankle pain    Closed fracture of distal end of left fibula with routine healing    Closed fracture of left ankle    Left sciatic nerve pain    Anxiety    Insomnia    Major depressive disorder, recurrent, moderate    Migraine         Current Outpatient Medications:     buPROPion XL (WELLBUTRIN XL) 150 MG 24 hr tablet, Take 1 tablet by mouth., Disp: , Rfl:     cetirizine (zyrTEC) 10 MG tablet, Take 1 tablet by mouth Daily., Disp: 14 tablet, Rfl: 0    ergocalciferol (ERGOCALCIFEROL) 1.25 MG (32440 UT) capsule, Take 1 capsule by mouth., Disp: , Rfl:     fluticasone (FLONASE) 50 MCG/ACT nasal spray, 2 sprays into the nostril(s) as directed by provider Daily., Disp: , Rfl:     Levonorgestrel (Mirena, 52 MG,) 20 MCG/DAY intrauterine device IUD, 1 each by Intrauterine route 1 (One) Time., Disp: , Rfl:     Topiramate ER (Trokendi XR) 25 MG capsule sustained-release 24 hr, Take 1 capsule by mouth 2 (Two) Times a Day., Disp: , Rfl:     amoxicillin-clavulanate  (AUGMENTIN) 875-125 MG per tablet, Take 1 tablet by mouth 2 (Two) Times a Day. (Patient not taking: Reported on 3/19/2024), Disp: 20 tablet, Rfl: 0    diclofenac (VOLTAREN) 75 MG EC tablet, Take 1 tablet by mouth 2 (Two) Times a Day. (Patient not taking: Reported on 3/19/2024), Disp: 60 tablet, Rfl: 1    methylPREDNISolone (MEDROL) 4 MG dose pack, Take as directed on package instructions. 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    CTS (carpal tunnel syndrome) 2019    Depression     Fracture, fibula 2021    Low back strain 2012    Migraine     Neck strain 2019     Past Surgical History:   Procedure Laterality Date    CARPAL TUNNEL RELEASE Bilateral      SECTION      x3    ELBOW PROCEDURE  10/2019 Left    2019    HAND SURGERY  10/2019 Left hand    2019 Right hand     Social History     Socioeconomic History    Marital status:    Tobacco Use    Smoking status: Former     Passive exposure: Past    Smokeless tobacco: Never   Vaping Use    Vaping status: Never Used   Substance and Sexual Activity    Alcohol use: Yes     Alcohol/week: 3.0 standard drinks of alcohol     Types: 2 Glasses of wine, 1 Drinks containing 0.5 oz of alcohol per week     Comment: Socially    Drug use: Yes     Comment: CBD    Sexual activity: Yes     Partners: Male     Birth control/protection: I.U.D.     Family History   Problem Relation Age of Onset    Diabetes Father     Breast cancer Neg Hx     Ovarian cancer Neg Hx     Uterine cancer Neg Hx     Colon cancer Neg Hx     Melanoma Neg Hx     Prostate cancer Neg Hx     Deep vein thrombosis Neg Hx        No radiology results for the last 90 days.        Objective     Blood pressure 110/77, pulse 77, temperature 98.2 °F (36.8 °C),  "temperature source Tympanic, resp. rate 14, height 152.4 cm (60\"), weight 69 kg (152 lb 3.2 oz), SpO2 98%, not currently breastfeeding.  Physical Exam  Vitals and nursing note reviewed.   Constitutional:       Appearance: Normal appearance.   HENT:      Head: Normocephalic and atraumatic.      Nose: Nose normal.      Mouth/Throat:      Mouth: Mucous membranes are moist.      Pharynx: Oropharynx is clear.   Eyes:      Extraocular Movements: Extraocular movements intact.      Conjunctiva/sclera: Conjunctivae normal.      Pupils: Pupils are equal, round, and reactive to light.   Cardiovascular:      Rate and Rhythm: Normal rate and regular rhythm.      Pulses: Normal pulses.      Heart sounds: Normal heart sounds.   Pulmonary:      Effort: Pulmonary effort is normal.      Breath sounds: Normal breath sounds.   Abdominal:      General: Abdomen is flat. Bowel sounds are normal.      Palpations: Abdomen is soft.   Musculoskeletal:         General: Normal range of motion.      Cervical back: Normal range of motion and neck supple.   Skin:     General: Skin is warm.      Capillary Refill: Capillary refill takes 2 to 3 seconds.   Neurological:      General: No focal deficit present.      Mental Status: She is alert and oriented to person, place, and time.   Psychiatric:         Mood and Affect: Mood normal.         Behavior: Behavior normal.         There is no immunization history on file for this patient.         Assessment & Plan     Diagnoses and all orders for this visit:    1. Insomnia, unspecified type (Primary)  -     Polysomnography 4 or more parameters; Future         Result Review :            Discussion/ Recommendations:   Patient is advised proper sleep hygiene and she is already aware of that  Continue Wellbutrin  Advised consultation with psychiatry for anxiety and depression management  We will schedule her for sleep study to rule out sleep apnea versus restless leg syndrome  Discussed vaccination and " recommended    BMI is >= 25 and <30. (Overweight) The following options were offered after discussion;: exercise counseling/recommendations           Return in about 3 months (around 6/19/2024).      Thank you for allowing me to participate in the care of Renee Lazo. Please do not hesitate to contact me with any questions.         This document has been electronically signed by Ryan Gamez MD on March 19, 2024 08:52 EDT

## 2024-03-25 ENCOUNTER — PATIENT ROUNDING (BHMG ONLY) (OUTPATIENT)
Dept: PULMONOLOGY | Facility: CLINIC | Age: 39
End: 2024-03-25
Payer: OTHER GOVERNMENT

## 2024-03-25 NOTE — PROGRESS NOTES
March 25, 2024    Hello, may I speak with Renee Lazo?    My name is Saige      I am  with [facility]  [address]  [address]  [place] [address] [phone].    Before we get started may I verify your date of birth? 1985    I am calling to officially welcome you to our practice and ask about your recent visit. Is this a good time to talk? My chart message sent for patient rounding.

## 2024-04-23 ENCOUNTER — HOSPITAL ENCOUNTER (OUTPATIENT)
Dept: SLEEP MEDICINE | Facility: HOSPITAL | Age: 39
Discharge: HOME | End: 2024-04-23
Admitting: INTERNAL MEDICINE
Payer: OTHER GOVERNMENT

## 2024-04-23 DIAGNOSIS — G47.00 INSOMNIA, UNSPECIFIED TYPE: ICD-10-CM

## 2024-04-23 PROCEDURE — 95810 POLYSOM 6/> YRS 4/> PARAM: CPT

## 2024-04-24 PROCEDURE — 95810 POLYSOM 6/> YRS 4/> PARAM: CPT | Performed by: INTERNAL MEDICINE

## 2024-06-17 ENCOUNTER — OFFICE VISIT (OUTPATIENT)
Dept: PULMONOLOGY | Facility: CLINIC | Age: 39
End: 2024-06-17
Payer: OTHER GOVERNMENT

## 2024-06-17 VITALS
BODY MASS INDEX: 29.57 KG/M2 | HEIGHT: 60 IN | SYSTOLIC BLOOD PRESSURE: 109 MMHG | TEMPERATURE: 98.2 F | WEIGHT: 150.6 LBS | HEART RATE: 69 BPM | OXYGEN SATURATION: 98 % | RESPIRATION RATE: 14 BRPM | DIASTOLIC BLOOD PRESSURE: 75 MMHG

## 2024-06-17 DIAGNOSIS — G47.00 INSOMNIA, UNSPECIFIED TYPE: Primary | ICD-10-CM

## 2024-06-17 PROCEDURE — 99214 OFFICE O/P EST MOD 30 MIN: CPT | Performed by: INTERNAL MEDICINE

## 2024-06-17 NOTE — PROGRESS NOTES
Pulmonary Office Follow-up    Subjective     Renee Lazo is seen today at the office for   Chief Complaint   Patient presents with    Follow-up     3 month    Insomnia    Results         HPI  Renee Lazo is a 38 y.o. female with a PMH significant for disturbed sleep and insomnia with history of anxiety presents for follow-up patient has been started on Wellbutrin for her anxiety she continues to have tiredness and fatigue but does not have any excessive snoring      Tobacco use history:  Former smoker      Patient Active Problem List   Diagnosis    Left ankle pain    Closed fracture of distal end of left fibula with routine healing    Closed fracture of left ankle    Left sciatic nerve pain    Anxiety    Insomnia    Major depressive disorder, recurrent, moderate    Migraine       Review of Systems  Review of Systems   All other systems reviewed and are negative.    As described in the HPI. Otherwise, remainder of ROS (14 systems) were negative.    Medications, Allergies, Social, and Family Histories reviewed as per EMR.    Result Review :            Objective     Vitals:    06/17/24 0853   BP: 109/75   Pulse: 69   Resp: 14   Temp: 98.2 °F (36.8 °C)   SpO2: 98%         06/17/24  0853   Weight: 68.3 kg (150 lb 9.6 oz)       Physical Exam  Vitals and nursing note reviewed.   Constitutional:       Appearance: Normal appearance.   HENT:      Head: Normocephalic and atraumatic.      Nose: Nose normal.      Mouth/Throat:      Mouth: Mucous membranes are moist.      Pharynx: Oropharynx is clear.   Eyes:      Extraocular Movements: Extraocular movements intact.      Conjunctiva/sclera: Conjunctivae normal.      Pupils: Pupils are equal, round, and reactive to light.   Cardiovascular:      Rate and Rhythm: Normal rate and regular rhythm.      Pulses: Normal pulses.      Heart sounds: Normal heart sounds.   Pulmonary:      Effort: Pulmonary effort is normal.      Breath sounds: Normal breath sounds.    Abdominal:      General: Abdomen is flat. Bowel sounds are normal.      Palpations: Abdomen is soft.   Musculoskeletal:         General: Normal range of motion.      Cervical back: Normal range of motion and neck supple.   Skin:     General: Skin is warm.      Capillary Refill: Capillary refill takes 2 to 3 seconds.   Neurological:      General: No focal deficit present.      Mental Status: She is alert and oriented to person, place, and time.   Psychiatric:         Mood and Affect: Mood normal.         Behavior: Behavior normal.         No radiology results for the last 90 days.     Assessment & Plan     Diagnoses and all orders for this visit:    1. Insomnia, unspecified type (Primary)         Discussion/ Recommendations:   Sleep study reviewed no significant sleep apnea was noted  Patient was noted to have some snoring  Advised to take Zyrtec at bedtime for her nasal allergies  Continue medications as prescribed by psychiatry  Vaccinations discussed and recommended             Return if symptoms worsen or fail to improve.          This document has been electronically signed by Ryan Gamez MD on June 17, 2024 09:04 EDT

## 2024-11-07 ENCOUNTER — HOSPITAL ENCOUNTER (EMERGENCY)
Facility: HOSPITAL | Age: 39
Discharge: HOME | End: 2024-11-07
Attending: EMERGENCY MEDICINE
Payer: OTHER GOVERNMENT

## 2024-11-07 VITALS
SYSTOLIC BLOOD PRESSURE: 119 MMHG | HEART RATE: 82 BPM | OXYGEN SATURATION: 99 % | HEIGHT: 60 IN | TEMPERATURE: 97.7 F | WEIGHT: 138.89 LBS | DIASTOLIC BLOOD PRESSURE: 85 MMHG | BODY MASS INDEX: 27.27 KG/M2 | RESPIRATION RATE: 16 BRPM

## 2024-11-07 DIAGNOSIS — N30.01 ACUTE CYSTITIS WITH HEMATURIA: Primary | ICD-10-CM

## 2024-11-07 LAB
BACTERIA UR QL AUTO: ABNORMAL /HPF
BILIRUB UR QL STRIP: NEGATIVE
CLARITY UR: ABNORMAL
COLOR UR: ABNORMAL
GLUCOSE UR STRIP-MCNC: NEGATIVE MG/DL
HGB UR QL STRIP.AUTO: ABNORMAL
HYALINE CASTS UR QL AUTO: ABNORMAL /LPF
KETONES UR QL STRIP: NEGATIVE
LEUKOCYTE ESTERASE UR QL STRIP.AUTO: ABNORMAL
NITRITE UR QL STRIP: NEGATIVE
PH UR STRIP.AUTO: 6.5 [PH] (ref 5–8)
PROT UR QL STRIP: ABNORMAL
RBC # UR STRIP: ABNORMAL /HPF
REF LAB TEST METHOD: ABNORMAL
SP GR UR STRIP: <=1.005 (ref 1–1.03)
SQUAMOUS #/AREA URNS HPF: ABNORMAL /HPF
UROBILINOGEN UR QL STRIP: ABNORMAL
WBC # UR STRIP: ABNORMAL /HPF

## 2024-11-07 PROCEDURE — 99283 EMERGENCY DEPT VISIT LOW MDM: CPT

## 2024-11-07 PROCEDURE — 81001 URINALYSIS AUTO W/SCOPE: CPT | Performed by: EMERGENCY MEDICINE

## 2024-11-07 RX ORDER — PHENAZOPYRIDINE HYDROCHLORIDE 200 MG/1
200 TABLET, FILM COATED ORAL 3 TIMES DAILY PRN
Qty: 6 TABLET | Refills: 0 | Status: SHIPPED | OUTPATIENT
Start: 2024-11-07

## 2024-11-07 RX ORDER — CEPHALEXIN 500 MG/1
500 CAPSULE ORAL 3 TIMES DAILY
Qty: 21 CAPSULE | Refills: 0 | Status: SHIPPED | OUTPATIENT
Start: 2024-11-07

## 2024-11-07 RX ORDER — SODIUM CHLORIDE 0.9 % (FLUSH) 0.9 %
10 SYRINGE (ML) INJECTION AS NEEDED
Status: DISCONTINUED | OUTPATIENT
Start: 2024-11-07 | End: 2024-11-07

## 2024-11-07 RX ORDER — PHENAZOPYRIDINE HYDROCHLORIDE 100 MG/1
200 TABLET, FILM COATED ORAL ONCE
Status: COMPLETED | OUTPATIENT
Start: 2024-11-07 | End: 2024-11-07

## 2024-11-07 RX ADMIN — PHENAZOPYRIDINE 200 MG: 100 TABLET ORAL at 08:37

## 2024-11-07 RX ADMIN — CEPHALEXIN 500 MG: 250 CAPSULE ORAL at 08:36

## 2024-11-07 NOTE — ED TRIAGE NOTES
"Pt to ED from home with reports of attempting to urinate this morning and it was painful towards the end of urination and pt states she had \"small blob of something that I don't know what it was, and then I went again because I still felt like I had to pee and there was blood in the toilet\".    "

## 2024-11-07 NOTE — DISCHARGE INSTRUCTIONS
Your urine sample definitely looks infected and has some blood present (UTI), so drink plenty of fluids to flush this infection out over the next few days and take the antibiotics 3 times a day this week for 5 to 7 days to treat your infection.    For the first couple of days you can also take the Pyridium pill 3 times a day for relief of pain and bladder spasm, but it will make your urine temporarily looked orange in color.

## 2024-11-07 NOTE — ED PROVIDER NOTES
Time: 7:20 AM EST  Date of encounter:  2024  Independent Historian/Clinical History and Information was obtained by:   Patient    History is limited by: N/A    Chief Complaint: Dysuria, urinary urgency, hematuria        History of Present Illness:  Patient is a 39 y.o. year old female with remote history of UTI, and recent abdominoplasty surgery of couple of weeks ago who presents to the emergency department for evaluation of some dysuria, urinary frequency and urgency and a small amount of visible blood in her urine after she urinated this morning.    No fevers or chills or flank pain but she does have some continued lower abdominal pain or discomfort and feeling of numbness after her abdominoplasty surgery a couple weeks ago.    She is not pregnant and has a Mirena IUD in place, and does not typically get menstrual periods.          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    CTS (carpal tunnel syndrome) 2019    Depression     Fracture, fibula 2021    Low back strain 2012    Migraine     Neck strain 2019     Past Surgical History:   Procedure Laterality Date    ABDOMINOPLASTY Bilateral     CARPAL TUNNEL RELEASE Bilateral      SECTION      x3    ELBOW PROCEDURE  10/2019 Left    2019    HAND SURGERY  10/2019 Left hand    2019 Right hand     Family History   Problem Relation Age of Onset    Diabetes Father     Hypertension Father     Hypertension Mother     Breast cancer Neg Hx     Ovarian cancer Neg Hx     Uterine cancer Neg Hx     Colon cancer Neg Hx     Melanoma Neg Hx     Prostate cancer Neg Hx     Deep vein thrombosis Neg Hx        Home Medications:  Prior to Admission medications    Medication Sig Start Date End Date Taking?  "Authorizing Provider   buPROPion XL (WELLBUTRIN XL) 150 MG 24 hr tablet Take 1 tablet by mouth. 12/7/23 3/19/24  Avelino Perez MD   cetirizine (zyrTEC) 10 MG tablet Take 1 tablet by mouth Daily. 10/28/21   Sola Khoury PA   diclofenac (VOLTAREN) 75 MG EC tablet Take 1 tablet by mouth 2 (Two) Times a Day. 9/21/23   Nichole Monae MD   ergocalciferol (ERGOCALCIFEROL) 1.25 MG (09914 UT) capsule Take 1 capsule by mouth. 12/7/23 12/6/24  Avelino Perez MD   fluticasone (FLONASE) 50 MCG/ACT nasal spray Administer 2 sprays into the nostril(s) as directed by provider Daily.    Avelino Perez MD   Levonorgestrel (Mirena, 52 MG,) 20 MCG/DAY intrauterine device IUD To be inserted one time by prescriber. Route intrauterine.    Avelino Perez MD   methylPREDNISolone (MEDROL) 4 MG dose pack Take as directed on package instructions. 1/23/24   Nicol Allen APRN   Topiramate ER (Trokendi XR) 25 MG capsule sustained-release 24 hr Take 1 capsule by mouth 2 (Two) Times a Day.    Avelino Perez MD        Social History:   Social History     Tobacco Use    Smoking status: Former     Passive exposure: Past    Smokeless tobacco: Never   Vaping Use    Vaping status: Never Used   Substance Use Topics    Alcohol use: Yes     Alcohol/week: 3.0 standard drinks of alcohol     Types: 2 Glasses of wine, 1 Drinks containing 0.5 oz of alcohol per week     Comment: Socially    Drug use: Not Currently     Types: Marijuana     Comment: For sleep         Review of Systems:  Review of Systems   I performed a 10 point review of systems which was all negative, except for the positives found in the HPI above.  Physical Exam:  /85 (BP Location: Left arm, Patient Position: Sitting)   Pulse 82   Temp 97.7 °F (36.5 °C) (Oral)   Resp 16   Ht 152.4 cm (60\")   Wt 63 kg (138 lb 14.2 oz)   SpO2 99%   BMI 27.13 kg/m²     Physical Exam   General: Awake alert and in no obvious distress    HEENT: " Head normocephalic atraumatic, eyes PERRLA EOMI, nose normal, oropharynx normal.    Neck: Supple full range of motion, no meningismus, no lymphadenopathy    Heart: Regular rate and rhythm, no murmurs or rubs, 2+ radial pulses bilaterally    Lungs: Clear to auscultation bilaterally without wheezes or crackles, no respiratory distress    Abdomen: Soft, mild tenderness throughout the lower abdomen which is appropriate for recent abdominoplasty surgery, nondistended, no rebound or guarding    Skin: Warm, dry, no rash    Musculoskeletal: Normal range of motion, no lower extremity edema    Neurologic: Oriented x3, no motor deficits no sensory deficits    Psychiatric: Mood appears stable, no psychosis          Procedures:  Procedures      Medical Decision Making:      Comorbidities that affect care:    Recent abdominoplasty surgery    External Notes reviewed:    Previous Labs: I reviewed previous pregnancy test 6 weeks ago was negative.  No recent urine cultures on file.      The following orders were placed and all results were independently analyzed by me:  Orders Placed This Encounter   Procedures    Urinalysis With Microscopic If Indicated (No Culture) - Urine, Clean Catch    Urinalysis, Microscopic Only - Urine, Clean Catch    Undress & Gown       Medications Given in the Emergency Department:  Medications   phenazopyridine (PYRIDIUM) tablet 200 mg (has no administration in time range)   cephalexin (KEFLEX) capsule 500 mg (has no administration in time range)        ED Course:         Labs:    Lab Results (last 24 hours)       Procedure Component Value Units Date/Time    Urinalysis With Microscopic If Indicated (No Culture) - Urine, Clean Catch [[id number]]  (Abnormal) Collected: 11/07/24 0732    Specimen: Urine, Clean Catch Updated: 11/07/24 0753     Color, UA Red     Appearance, UA Cloudy     pH, UA 6.5     Specific Gravity, UA <=1.005     Glucose, UA Negative     Ketones, UA Negative     Bilirubin, UA Negative      Blood, UA Large (3+)     Protein, UA >=300 mg/dL (3+)     Leuk Esterase, UA Large (3+)     Nitrite, UA Negative     Urobilinogen, UA 0.2 E.U./dL    Urinalysis, Microscopic Only - Urine, Clean Catch [[id number]]  (Abnormal) Collected: 11/07/24 0732    Specimen: Urine, Clean Catch Updated: 11/07/24 0753     RBC, UA 21-50 /HPF      WBC, UA Too Numerous to Count /HPF      Bacteria, UA 2+ /HPF      Squamous Epithelial Cells, UA 0-2 /HPF      Hyaline Casts, UA 0-2 /LPF      Methodology Manual Light Microscopy             Imaging:    No Radiology Exams Resulted Within Past 24 Hours      Differential Diagnosis and Discussion:    Dysuria: Differential diagnosis includes but is not limited to urethritis, cystitis, pyelonephritis, ureteral calculi, neoplasm, chemical irritant, urethral stricture, and trauma  Hematuria: Differential diagnosis includes but is not limited to medications, coagulopathy, glomerulonephritis, nephritis, neoplasm, vascular abnormalities, cystitis, urethritis, neoplasms of the bladder, and autoimmune disorders.    All labs were reviewed and interpreted by me.    MDM           This patient is a 39-year-old female presenting with dysuria, urinary urgency and frequency and hematuria today.    She has no fever, no tachycardia no flank pain or vomiting and no signs of sepsis or pyelonephritis.    I am checking urinalysis to look for probable acute UTI/cystitis.  Urinalysis came back strongly positive for UTI and some hematuria present.    I will start her on oral antibiotics such as Keflex, Pyridium for symptom relief for the first couple of days, increased p.o. fluids to flush out urinary tract of infection, PCP follow-up and work excuse as needed.    Return precautions given for any worsening symptoms.                  Patient Care Considerations:          Consultants/Shared Management Plan:        Social Determinants of Health:    Patient is independent, reliable, and has access to care.       Disposition  and Care Coordination:    Discharged: The patient is suitable and stable for discharge with no need for consideration of admission.    I have explained the patient´s condition, diagnoses and treatment plan based on the information available to me at this time. I have answered questions and addressed any concerns. The patient has a good  understanding of the patient´s diagnosis, condition, and treatment plan as can be expected at this point. The vital signs have been stable. The patient´s condition is stable and appropriate for discharge from the emergency department.      The patient will pursue further outpatient evaluation with the primary care physician or other designated or consulting physician as outlined in the discharge instructions. They are agreeable to this plan of care and follow-up instructions have been explained in detail. The patient has received these instructions in written format and has expressed an understanding of the discharge instructions. The patient is aware that any significant change in condition or worsening of symptoms should prompt an immediate return to this or the closest emergency department or call to 911.  I have explained discharge medications and the need for follow up with the patient/caretakers. This was also printed in the discharge instructions. Patient was discharged with the following medications and follow up:      Medication List        New Prescriptions      cephalexin 500 MG capsule  Commonly known as: KEFLEX  Take 1 capsule by mouth 3 (Three) Times a Day.     phenazopyridine 200 MG tablet  Commonly known as: PYRIDIUM  Take 1 tablet by mouth 3 (Three) Times a Day As Needed for Dysuria or Bladder Spasms.               Where to Get Your Medications        These medications were sent to [id number] - [place], [place] - [address] AT [facility] [phone] [facility] [phone] FX  [address] [place] [address]       Phone: [phone]   cephalexin 500 MG capsule  phenazopyridine 200 MG tablet      Leona Luther MD  [address] [place] [address]  [place] [address]  [phone]    Call in 2 days  As needed, If symptoms worsen, for a follow-up appointment       Final diagnoses:   Acute cystitis with hematuria        ED Disposition       ED Disposition   Discharge    Condition   Stable    Comment   --               This medical record created using voice recognition software.             Mega Dunlap MD  11/07/24 0086

## 2024-11-07 NOTE — Clinical Note
[facility] EMERGENCY ROOM  [address] [place] [address]  Phone: [phone]  Fax: [phone]    Renee Lazo was seen and treated in our emergency department on 11/7/2024.  She may return to work on 11/08/2024.         Thank you for choosing [facility].    Mega Dunlap MD

## 2024-11-07 NOTE — Clinical Note
[facility] EMERGENCY ROOM  [address] [place] [address]  Phone: [phone]  Fax: [phone]    Renee Lazo was seen and treated in our emergency department on 11/7/2024.  She may return to work on 11/10/2024.         Thank you for choosing [facility].    Mega Dunlap MD

## 2024-11-18 ENCOUNTER — APPOINTMENT (OUTPATIENT)
Dept: ULTRASOUND IMAGING | Facility: HOSPITAL | Age: 39
End: 2024-11-18
Payer: OTHER GOVERNMENT

## 2024-11-18 ENCOUNTER — HOSPITAL ENCOUNTER (EMERGENCY)
Facility: HOSPITAL | Age: 39
Discharge: HOME | End: 2024-11-18
Attending: EMERGENCY MEDICINE | Admitting: EMERGENCY MEDICINE
Payer: OTHER GOVERNMENT

## 2024-11-18 ENCOUNTER — APPOINTMENT (OUTPATIENT)
Dept: CT IMAGING | Facility: HOSPITAL | Age: 39
End: 2024-11-18
Payer: OTHER GOVERNMENT

## 2024-11-18 VITALS
TEMPERATURE: 98.4 F | HEART RATE: 64 BPM | BODY MASS INDEX: 27.66 KG/M2 | RESPIRATION RATE: 16 BRPM | WEIGHT: 140.87 LBS | HEIGHT: 60 IN | SYSTOLIC BLOOD PRESSURE: 124 MMHG | DIASTOLIC BLOOD PRESSURE: 91 MMHG | OXYGEN SATURATION: 98 %

## 2024-11-18 DIAGNOSIS — R10.9 FLANK PAIN: Primary | ICD-10-CM

## 2024-11-18 LAB
ALBUMIN SERPL-MCNC: 4.5 G/DL (ref 3.5–5.2)
ALBUMIN/GLOB SERPL: 1.5 G/DL
ALP SERPL-CCNC: 61 U/L (ref 39–117)
ALT SERPL W P-5'-P-CCNC: 6 U/L (ref 1–33)
ANION GAP SERPL CALCULATED.3IONS-SCNC: 12.9 MMOL/L (ref 5–15)
AST SERPL-CCNC: 9 U/L (ref 1–32)
BASOPHILS # BLD AUTO: 0.03 10*3/MM3 (ref 0–0.2)
BASOPHILS NFR BLD AUTO: 0.6 % (ref 0–1.5)
BILIRUB SERPL-MCNC: 0.3 MG/DL (ref 0–1.2)
BILIRUB UR QL STRIP: NEGATIVE
BUN SERPL-MCNC: 9 MG/DL (ref 6–20)
BUN/CREAT SERPL: 15 (ref 7–25)
CALCIUM SPEC-SCNC: 9.2 MG/DL (ref 8.6–10.5)
CHLORIDE SERPL-SCNC: 100 MMOL/L (ref 98–107)
CLARITY UR: CLEAR
CO2 SERPL-SCNC: 25.1 MMOL/L (ref 22–29)
COLOR UR: YELLOW
CREAT SERPL-MCNC: 0.6 MG/DL (ref 0.57–1)
DEPRECATED RDW RBC AUTO: 41.2 FL (ref 37–54)
EGFRCR SERPLBLD CKD-EPI 2021: 116.5 ML/MIN/1.73
EOSINOPHIL # BLD AUTO: 0.13 10*3/MM3 (ref 0–0.4)
EOSINOPHIL NFR BLD AUTO: 2.5 % (ref 0.3–6.2)
ERYTHROCYTE [DISTWIDTH] IN BLOOD BY AUTOMATED COUNT: 12.6 % (ref 12.3–15.4)
GLOBULIN UR ELPH-MCNC: 3 GM/DL
GLUCOSE SERPL-MCNC: 85 MG/DL (ref 65–99)
GLUCOSE UR STRIP-MCNC: NEGATIVE MG/DL
HCG SERPL QL: NEGATIVE
HCT VFR BLD AUTO: 37.8 % (ref 34–46.6)
HGB BLD-MCNC: 12.7 G/DL (ref 12–15.9)
HGB UR QL STRIP.AUTO: NEGATIVE
IMM GRANULOCYTES # BLD AUTO: 0.02 10*3/MM3 (ref 0–0.05)
IMM GRANULOCYTES NFR BLD AUTO: 0.4 % (ref 0–0.5)
KETONES UR QL STRIP: NEGATIVE
LEUKOCYTE ESTERASE UR QL STRIP.AUTO: NEGATIVE
LYMPHOCYTES # BLD AUTO: 1.59 10*3/MM3 (ref 0.7–3.1)
LYMPHOCYTES NFR BLD AUTO: 30.9 % (ref 19.6–45.3)
MCH RBC QN AUTO: 30.3 PG (ref 26.6–33)
MCHC RBC AUTO-ENTMCNC: 33.6 G/DL (ref 31.5–35.7)
MCV RBC AUTO: 90.2 FL (ref 79–97)
MONOCYTES # BLD AUTO: 0.34 10*3/MM3 (ref 0.1–0.9)
MONOCYTES NFR BLD AUTO: 6.6 % (ref 5–12)
NEUTROPHILS NFR BLD AUTO: 3.04 10*3/MM3 (ref 1.7–7)
NEUTROPHILS NFR BLD AUTO: 59 % (ref 42.7–76)
NITRITE UR QL STRIP: NEGATIVE
NRBC BLD AUTO-RTO: 0 /100 WBC (ref 0–0.2)
PH UR STRIP.AUTO: 5.5 [PH] (ref 5–8)
PLATELET # BLD AUTO: 293 10*3/MM3 (ref 140–450)
PMV BLD AUTO: 8.5 FL (ref 6–12)
POTASSIUM SERPL-SCNC: 3.5 MMOL/L (ref 3.5–5.2)
PROT SERPL-MCNC: 7.5 G/DL (ref 6–8.5)
PROT UR QL STRIP: NEGATIVE
RBC # BLD AUTO: 4.19 10*6/MM3 (ref 3.77–5.28)
SODIUM SERPL-SCNC: 138 MMOL/L (ref 136–145)
SP GR UR STRIP: 1.01 (ref 1–1.03)
UROBILINOGEN UR QL STRIP: NORMAL
WBC NRBC COR # BLD AUTO: 5.15 10*3/MM3 (ref 3.4–10.8)
WHOLE BLOOD HOLD COAG: NORMAL

## 2024-11-18 PROCEDURE — 80053 COMPREHEN METABOLIC PANEL: CPT | Performed by: STUDENT IN AN ORGANIZED HEALTH CARE EDUCATION/TRAINING PROGRAM

## 2024-11-18 PROCEDURE — 99284 EMERGENCY DEPT VISIT MOD MDM: CPT

## 2024-11-18 PROCEDURE — 96374 THER/PROPH/DIAG INJ IV PUSH: CPT

## 2024-11-18 PROCEDURE — 85025 COMPLETE CBC W/AUTO DIFF WBC: CPT | Performed by: STUDENT IN AN ORGANIZED HEALTH CARE EDUCATION/TRAINING PROGRAM

## 2024-11-18 PROCEDURE — 25010000002 KETOROLAC TROMETHAMINE PER 15 MG: Performed by: STUDENT IN AN ORGANIZED HEALTH CARE EDUCATION/TRAINING PROGRAM

## 2024-11-18 PROCEDURE — 81003 URINALYSIS AUTO W/O SCOPE: CPT | Performed by: STUDENT IN AN ORGANIZED HEALTH CARE EDUCATION/TRAINING PROGRAM

## 2024-11-18 PROCEDURE — 74176 CT ABD & PELVIS W/O CONTRAST: CPT

## 2024-11-18 PROCEDURE — 84703 CHORIONIC GONADOTROPIN ASSAY: CPT | Performed by: STUDENT IN AN ORGANIZED HEALTH CARE EDUCATION/TRAINING PROGRAM

## 2024-11-18 PROCEDURE — 36415 COLL VENOUS BLD VENIPUNCTURE: CPT

## 2024-11-18 RX ORDER — KETOROLAC TROMETHAMINE 30 MG/ML
30 INJECTION, SOLUTION INTRAMUSCULAR; INTRAVENOUS ONCE AS NEEDED
Status: COMPLETED | OUTPATIENT
Start: 2024-11-18 | End: 2024-11-18

## 2024-11-18 RX ORDER — KETOROLAC TROMETHAMINE 10 MG/1
10 TABLET, FILM COATED ORAL EVERY 6 HOURS PRN
Qty: 20 TABLET | Refills: 0 | Status: SHIPPED | OUTPATIENT
Start: 2024-11-18 | End: 2024-11-23

## 2024-11-18 RX ADMIN — KETOROLAC TROMETHAMINE 30 MG: 30 INJECTION, SOLUTION INTRAMUSCULAR; INTRAVENOUS at 15:08

## 2024-11-18 NOTE — ED PROVIDER NOTES
Time: 3:50 PM EST  Date of encounter:  2024  Independent Historian/Clinical History and Information was obtained by:   Patient    History is limited by: N/A    Chief Complaint: Right flank pain      History of Present Illness:  Patient is a 39 y.o. year old female who presents to the emergency department for evaluation of worsening right flank pain.  Patient seen here  with same symptoms, had a urinary tract infection, finished the antibiotic.  Patient denies any burning, but does say that she still feels incomplete urination.  She does say that she had a tummy tuck back in September, she recently called her surgeon that did the procedure because of the flank pain that she is having, they started her on a muscle relaxer and still having symptoms.  No fever, nausea, vomiting, diarrhea.      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    CTS (carpal tunnel syndrome) 2019    Depression     Fracture, fibula 2021    Low back strain 2012    Migraine     Neck strain 2019     Past Surgical History:   Procedure Laterality Date    ABDOMINOPLASTY Bilateral     CARPAL TUNNEL RELEASE Bilateral      SECTION      x3    ELBOW PROCEDURE  10/2019 Left    2019    HAND SURGERY  10/2019 Left hand    2019 Right hand     Family History   Problem Relation Age of Onset    Diabetes Father     Hypertension Father     Hypertension Mother     Breast cancer Neg Hx     Ovarian cancer Neg Hx     Uterine cancer Neg Hx     Colon cancer Neg Hx     Melanoma Neg Hx     Prostate cancer Neg Hx     Deep vein thrombosis Neg Hx        Home Medications:  Prior to Admission medications    Medication Sig Start Date End Date Taking? Authorizing Provider   buPROPion XL  (WELLBUTRIN XL) 150 MG 24 hr tablet Take 1 tablet by mouth. 12/7/23 3/19/24  Avelino Perez MD   cephalexin (KEFLEX) 500 MG capsule Take 1 capsule by mouth 3 (Three) Times a Day. 11/7/24   Mega Dunlap MD   cetirizine (zyrTEC) 10 MG tablet Take 1 tablet by mouth Daily. 10/28/21   Sola Khoury PA   diclofenac (VOLTAREN) 75 MG EC tablet Take 1 tablet by mouth 2 (Two) Times a Day. 9/21/23   Nichole Monae MD   ergocalciferol (ERGOCALCIFEROL) 1.25 MG (53092 UT) capsule Take 1 capsule by mouth. 12/7/23 12/6/24  Avelino Perez MD   fluticasone (FLONASE) 50 MCG/ACT nasal spray Administer 2 sprays into the nostril(s) as directed by provider Daily.    Avelino Perez MD   ketorolac (TORADOL) 10 MG tablet Take 1 tablet by mouth Every 6 (Six) Hours As Needed for Moderate Pain for up to 5 days. 11/18/24 11/23/24  Sandoval Hatfield PA-C   Levonorgestrel (Mirena, 52 MG,) 20 MCG/DAY intrauterine device IUD To be inserted one time by prescriber. Route intrauterine.    Avelino Perez MD   methylPREDNISolone (MEDROL) 4 MG dose pack Take as directed on package instructions. 1/23/24   Nicol Allen APRN   phenazopyridine (PYRIDIUM) 200 MG tablet Take 1 tablet by mouth 3 (Three) Times a Day As Needed for Dysuria or Bladder Spasms. 11/7/24   Mega Dunlap MD   Topiramate ER (Trokendi XR) 25 MG capsule sustained-release 24 hr Take 1 capsule by mouth 2 (Two) Times a Day.    ProviderAvelino MD        Social History:   Social History     Tobacco Use    Smoking status: Former     Passive exposure: Past    Smokeless tobacco: Never   Vaping Use    Vaping status: Never Used   Substance Use Topics    Alcohol use: Yes     Alcohol/week: 3.0 standard drinks of alcohol     Types: 2 Glasses of wine, 1 Drinks containing 0.5 oz of alcohol per week     Comment: Socially    Drug use: Not Currently     Types: Marijuana     Comment: For sleep         Review of Systems:  Review of Systems  "  Genitourinary:  Positive for flank pain.   All other systems reviewed and are negative.       Physical Exam:  /91   Pulse 64   Temp 98.4 °F (36.9 °C) (Oral)   Resp 16   Ht 152.4 cm (60\")   Wt 63.9 kg (140 lb 14 oz)   SpO2 98%   BMI 27.51 kg/m²     Physical Exam  Vitals and nursing note reviewed.        Vital Signs reviewed, nursing note reviewed  Constitutional: Alert, normal weight, normal appearance, no acute distress  HEENT: Normocephalic, atraumatic,  Eyes: PERRL, conjunctivae normal, extraocular movements intact  Cardiovascular: Normal rate, regular rhythm, heart sounds normal\  Pulmonary: Effort normal, breath sounds normal  Musculoskeletal: Range of motion normal  Skin: Warm, dry, normal for ethnicity  Neurological: Oriented x 3  Psychiatric/behavioral: Mood normal, thought content normal, judgment normal, behavior normal          Procedures:  Procedures      Medical Decision Making:      Comorbidities that affect care:    None    External Notes reviewed:          The following orders were placed and all results were independently analyzed by me:  Orders Placed This Encounter   Procedures    CT Abdomen Pelvis Without Contrast    Comprehensive Metabolic Panel    Urinalysis With Culture If Indicated - Urine, Clean Catch    CBC Auto Differential    hCG, Serum, Qualitative    CBC & Differential    Extra Tubes    Light Blue Top       Medications Given in the Emergency Department:  Medications   ketorolac (TORADOL) injection 30 mg (30 mg Intravenous Given 11/18/24 1508)        ED Course:         Labs:    Lab Results (last 24 hours)       Procedure Component Value Units Date/Time    Urinalysis With Culture If Indicated - Urine, Clean Catch [[id number]]  (Normal) Collected: 11/18/24 1210    Specimen: Urine, Clean Catch Updated: 11/18/24 1235     Color, UA Yellow     Appearance, UA Clear     pH, UA 5.5     Specific Gravity, UA 1.006     Glucose, UA Negative     Ketones, UA Negative     Bilirubin, UA " Negative     Blood, UA Negative     Protein, UA Negative     Leuk Esterase, UA Negative     Nitrite, UA Negative     Urobilinogen, UA 0.2 E.U./dL    Narrative:      In absence of clinical symptoms, the presence of pyuria, bacteria, and/or nitrites on the urinalysis result does not correlate with infection.  Urine microscopic not indicated.    CBC & Differential [[id number]]  (Normal) Collected: 11/18/24 1227    Specimen: Blood Updated: 11/18/24 1235    Narrative:      The following orders were created for panel order CBC & Differential.  Procedure                               Abnormality         Status                     ---------                               -----------         ------                     CBC Auto Differential[[id number]]        Normal              Final result                 Please view results for these tests on the individual orders.    Comprehensive Metabolic Panel [[id number]] Collected: 11/18/24 1227    Specimen: Blood Updated: 11/18/24 1255     Glucose 85 mg/dL      BUN 9 mg/dL      Creatinine 0.60 mg/dL      Sodium 138 mmol/L      Potassium 3.5 mmol/L      Chloride 100 mmol/L      CO2 25.1 mmol/L      Calcium 9.2 mg/dL      Total Protein 7.5 g/dL      Albumin 4.5 g/dL      ALT (SGPT) 6 U/L      AST (SGOT) 9 U/L      Alkaline Phosphatase 61 U/L      Total Bilirubin 0.3 mg/dL      Globulin 3.0 gm/dL      A/G Ratio 1.5 g/dL      BUN/Creatinine Ratio 15.0     Anion Gap 12.9 mmol/L      eGFR 116.5 mL/min/1.73     Narrative:      GFR Normal >60  Chronic Kidney Disease <60  Kidney Failure <15      CBC Auto Differential [[id number]]  (Normal) Collected: 11/18/24 1227    Specimen: Blood Updated: 11/18/24 1235     WBC 5.15 10*3/mm3      RBC 4.19 10*6/mm3      Hemoglobin 12.7 g/dL      Hematocrit 37.8 %      MCV 90.2 fL      MCH 30.3 pg      MCHC 33.6 g/dL      RDW 12.6 %      RDW-SD 41.2 fl      MPV 8.5 fL      Platelets 293 10*3/mm3      Neutrophil % 59.0 %      Lymphocyte % 30.9 %      Monocyte %  6.6 %      Eosinophil % 2.5 %      Basophil % 0.6 %      Immature Grans % 0.4 %      Neutrophils, Absolute 3.04 10*3/mm3      Lymphocytes, Absolute 1.59 10*3/mm3      Monocytes, Absolute 0.34 10*3/mm3      Eosinophils, Absolute 0.13 10*3/mm3      Basophils, Absolute 0.03 10*3/mm3      Immature Grans, Absolute 0.02 10*3/mm3      nRBC 0.0 /100 WBC     hCG, Serum, Qualitative [[id number]]  (Normal) Collected: 11/18/24 1227    Specimen: Blood Updated: 11/18/24 1305     HCG Qualitative Negative    Narrative:      Sensitive immunoassays may demonstrate false positive results  with specimens containing heterophilic antibodies. Assays may  also exhibit false-positive or false-negative results with  specimens containing human anti-mouse antibodies. These   specimens may come from patients receiving preparations of  mouse monoclonal antibodies for diagnosis or therapy or having  been exposed to mice. If the qualitative interpretation is  inconsistent with the clinical evaluation, results should be   confirmed by an alternate hCG method, ie. quantitative hCG.             Imaging:    CT Abdomen Pelvis Without Contrast    Result Date: 11/18/2024  CT ABDOMEN PELVIS WO CONTRAST Date of Exam: 11/18/2024 1:24 PM EST Indication: right flank pain. Comparison: None available. Technique: Axial CT images were obtained of the abdomen and pelvis without the administration of contrast. Reconstructed coronal and sagittal images were also obtained. Automated exposure control and iterative construction methods were used. Findings: The lung bases are clear. Postoperative changes are seen in the anterior abdominal wall with scarring noted. Contrast not utilized. Small hypodense liver lesion may be a small cyst. Gallbladder is normal. Spleen and pancreas appear normal. Adrenal glands appear normal. Kidneys are normal. Punctate nonobstructing left-sided nephrolith measuring 2 mm. No ureteral  stone or obstructive uropathy. Bladder is normal. There  is an IUD in the uterus. No suspicious adnexal lesions are seen. There may be a functional cyst on the left. Colon is normal. There is no evidence for appendicitis. Normal-appearing small bowel. Normal aorta. No adenopathy. No ascites. No acute osseous findings.     Impression: Punctate nonobstructing left-sided nephrolith. No ureteral stone. Other findings as above. Electronically Signed: Joaquin Dallas MD  11/18/2024 2:04 PM EST  Workstation ID: [id number]       Differential Diagnosis and Discussion:    Flank Pain: Differential diagnosis includes but is not limited to kidney stones, pyelonephritis, musculoskeletal disorders, renal infarction, urinary tract infection, hydronephrosis, radiculopathy, aortic aneurysm, renal cell carcinoma.    Patient has unremarkable lab workup here today.  Urine is negative for signs of infection.  It appears that from prior visit that patient was started on Keflex, she finished this out for a urinary tract infection.  Patient says that she is having right flank pain, right upper quadrant abdominal pain.  Denies any recent fall or injury to explain the pain.  CT abdomen pelvis does not reveal any ureteral stones, or explanation to cause her pain, gallbladder unremarkable, bilirubin within normal limits, LFTs within normal limits.  I do not have concerns for gallbladder problems.  This being said the pain that she is having could still be from the recent urinary tract infection, she is still having some symptoms of incomplete emptying when urinating.  I did give her some IV Toradol here, sent with p.o. Toradol for at home.  Patient is encouraged to follow-up with urology if symptoms do not resolve within the next week.        MDM                     Patient Care Considerations:          Consultants/Shared Management Plan:        Social Determinants of Health:          Disposition and Care Coordination:    Discharged: The patient is suitable and stable for discharge with no need for  consideration of admission.        Final diagnoses:   Flank pain        ED Disposition       ED Disposition   Discharge    Condition   Stable    Comment   --               This medical record created using voice recognition software.             Sandoval Hatfield PA-C  11/18/24 [id number]

## 2024-11-18 NOTE — DISCHARGE INSTRUCTIONS
Take medicine as directed.  Increase water intake at home.  If you are still having symptoms and bout 4 to 5 days I do recommend that you follow-up with urology.

## 2025-01-09 ENCOUNTER — PATIENT ROUNDING (BHMG ONLY) (OUTPATIENT)
Dept: URGENT CARE | Facility: CLINIC | Age: 40
End: 2025-01-09
Payer: OTHER GOVERNMENT

## 2025-01-09 NOTE — ED NOTES
Thank you for letting us care for you in your recent visit to our urgent care center. We would love to hear about your experience with us. Was this the first time you have visited our location?    We’re always looking for ways to make our patients’ experiences even better. Do you have any recommendations on ways we may improve?     I appreciate you taking the time to respond. Please be on the lookout for a survey about your recent visit from [facility] via text or email. We would greatly appreciate if you could fill that out and turn it back in. We want your voice to be heard and we value your feedback.   Thank you for choosing [facility] for your healthcare needs.